# Patient Record
Sex: MALE | Race: BLACK OR AFRICAN AMERICAN | NOT HISPANIC OR LATINO | Employment: UNEMPLOYED | URBAN - METROPOLITAN AREA
[De-identification: names, ages, dates, MRNs, and addresses within clinical notes are randomized per-mention and may not be internally consistent; named-entity substitution may affect disease eponyms.]

---

## 2017-05-22 ENCOUNTER — TELEPHONE (OUTPATIENT)
Dept: SPINE | Facility: CLINIC | Age: 62
End: 2017-05-22

## 2017-05-22 DIAGNOSIS — M54.50 LOW BACK PAIN WITHOUT SCIATICA, UNSPECIFIED BACK PAIN LATERALITY, UNSPECIFIED CHRONICITY: Primary | ICD-10-CM

## 2017-05-23 ENCOUNTER — HOSPITAL ENCOUNTER (OUTPATIENT)
Dept: RADIOLOGY | Facility: OTHER | Age: 62
Discharge: HOME | End: 2017-05-23
Attending: ORTHOPAEDIC SURGERY
Payer: MEDICARE

## 2017-05-23 ENCOUNTER — OFFICE VISIT (OUTPATIENT)
Dept: SPINE | Facility: CLINIC | Age: 62
End: 2017-05-23
Payer: MEDICARE

## 2017-05-23 VITALS
HEIGHT: 74 IN | HEART RATE: 75 BPM | DIASTOLIC BLOOD PRESSURE: 87 MMHG | SYSTOLIC BLOOD PRESSURE: 147 MMHG | WEIGHT: 240 LBS | BODY MASS INDEX: 30.8 KG/M2

## 2017-05-23 DIAGNOSIS — M51.37 DDD (DEGENERATIVE DISC DISEASE), LUMBOSACRAL: ICD-10-CM

## 2017-05-23 DIAGNOSIS — G89.29 CHRONIC BILATERAL LOW BACK PAIN WITHOUT SCIATICA: ICD-10-CM

## 2017-05-23 DIAGNOSIS — M54.50 LOW BACK PAIN WITHOUT SCIATICA, UNSPECIFIED BACK PAIN LATERALITY, UNSPECIFIED CHRONICITY: ICD-10-CM

## 2017-05-23 DIAGNOSIS — M47.819 SPONDYLOSIS WITHOUT MYELOPATHY: ICD-10-CM

## 2017-05-23 DIAGNOSIS — M54.50 CHRONIC BILATERAL LOW BACK PAIN WITHOUT SCIATICA: ICD-10-CM

## 2017-05-23 PROCEDURE — 72100 X-RAY EXAM L-S SPINE 2/3 VWS: CPT | Mod: 26,,, | Performed by: RADIOLOGY

## 2017-05-23 PROCEDURE — 99999 PR PBB SHADOW E&M-EST. PATIENT-LVL V: CPT | Mod: PBBFAC,,, | Performed by: PHYSICIAN ASSISTANT

## 2017-05-23 PROCEDURE — 99204 OFFICE O/P NEW MOD 45 MIN: CPT | Mod: S$PBB,,, | Performed by: PHYSICIAN ASSISTANT

## 2017-05-23 PROCEDURE — 99215 OFFICE O/P EST HI 40 MIN: CPT | Mod: PBBFAC,25 | Performed by: PHYSICIAN ASSISTANT

## 2017-05-23 PROCEDURE — 72120 X-RAY BEND ONLY L-S SPINE: CPT | Mod: 26,,, | Performed by: RADIOLOGY

## 2017-05-23 RX ORDER — DICLOFENAC SODIUM 75 MG/1
75 TABLET, DELAYED RELEASE ORAL 2 TIMES DAILY PRN
Qty: 60 TABLET | Refills: 2 | Status: SHIPPED | OUTPATIENT
Start: 2017-05-23

## 2017-05-23 RX ORDER — TIZANIDINE 4 MG/1
4 TABLET ORAL EVERY 8 HOURS PRN
Qty: 90 TABLET | Refills: 2 | Status: SHIPPED | OUTPATIENT
Start: 2017-05-23 | End: 2018-09-14

## 2017-05-23 NOTE — PROGRESS NOTES
Subjective:     Patient ID:  Eb Grubbs is a 61 y.o. male.    Self-Referral    Chief Complaint:   Back pain    HPI    Eb Grubbs is a 61 y.o. male who presents with the above CC.  Patient has had low back pain since he was in the  in 1970s that would come and go.  About one month ago the pain has gotten worse and is getting progressively worse.  Pain is across the low back rated 7-8/10 that is constant and worse with standing and better with sitting and walking.  No radiating leg pain.  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      Patient had PT about 6 months ago that helped some.  One DEANDRE in 2003 with Dr. Simon that really helped.  No spine surgery.  Patient was taking tramadol and lodine but stopped because it stopped working.    Patient denies any recent accidents or trauma, no saddle anesthesias, and no bowel or bladder incontinence.      Review of Systems:  Please refer to page three of the spine center intake form for a complete review of systems.    Past Medical History:   Diagnosis Date    Arthritis     Cancer     Left leg above knee    Chronic gout     Diabetes mellitus     Hypertension      Past Surgical History:   Procedure Laterality Date    COLONOSCOPY N/A 2/23/2016    Procedure: COLONOSCOPY;  Surgeon: KELLEY Lora MD;  Location: [facility] ([address] [place] [address]);  Service: Endoscopy;  Laterality: N/A;    colonscopy      x2    KNEE ARTHROSCOPY W/ ACL RECONSTRUCTION  1975    left    KNEE SURGERY  7-2012    left/TKR    KNEE SURGERY  11-8-2013/done at [facility] in [place]    left/removed with new replacement along with partial femur replacement due to a giant cell tumor     Current Outpatient Prescriptions on File Prior to Visit   Medication Sig Dispense Refill    allopurinol (ZYLOPRIM) 100 MG tablet Take 100 mg by mouth once daily.       losartan (COZAAR) 50 MG tablet Take 50 mg by mouth once daily.       "metoprolol succinate (TOPROL-XL) 25 MG 24 hr tablet Take 25 mg by mouth once daily.      multivitamin capsule Take 1 capsule by mouth once daily.      simvastatin (ZOCOR) 5 MG tablet Take 2.5 mg by mouth every evening.      [DISCONTINUED] etodolac (LODINE) 400 MG tablet Take 1 tablet (400 mg total) by mouth 2 (two) times daily as needed (pain/inflammation). 30 tablet 0    calcium citrate-vitamin D3 250 mg calcium- 200 unit Tab Take 2 tablets by mouth 2 (two) times daily.      hydrocodone-acetaminophen 5-325mg (NORCO) 5-325 mg per tablet Take 1 tablet by mouth every 6 (six) hours as needed for Pain.      HYDROmorphone (DILAUDID) 4 MG tablet Take one or two every four hours as needed for pain 60 tablet 0    insulin aspart (NOVOLOG) 100 unit/mL injection Inject 8 Units into the skin 3 (three) times daily before meals.        No current facility-administered medications on file prior to visit.      Review of patient's allergies indicates:  No Known Allergies  Social History     Social History    Marital status:      Spouse name: N/A    Number of children: N/A    Years of education: N/A     Occupational History    Not on file.     Social History Main Topics    Smoking status: Never Smoker    Smokeless tobacco: Never Used    Alcohol use No    Drug use: No    Sexual activity: Yes     Partners: Female     Other Topics Concern    Not on file     Social History Narrative    No narrative on file     Family History   Problem Relation Age of Onset    Cancer Mother     Cancer Father        Objective:      Vitals:    05/23/17 0904   BP: (!) 147/87   Pulse: 75   Weight: 108.9 kg (240 lb)   Height: 6' 2" (1.88 m)   PainSc:   7   PainLoc: Back         Physical Exam:    General:  Eb Grubbs is well-developed, well-nourished, appears stated age, in no acute distress, alert and oriented to person, place, and time.    Pulmonary/Chest:  Respiratory effort normal  Abdominal: Exhibits no " distension  Psychiatric:  Normal mood and affect.  Behavior is normal.  Judgement and thought content normal    Musculoskeletal:    Patient arises from a sitting to standing position without difficulty.  Patient walks to the door without evidence of limp, pain, or abnormality of gait.    Lumbar ROM:   Pain in lumbar flexion, extension, right lateral bending.  No pain in left lateral bending.    Lumbar Spine Inspection:  Normal with no surgical scars and no visible rashes.    Lumbar Spine Palpation:  No tenderness to low back palpation.    SI Joint Palpation:  No tenderness to left SI Joint palpation.  Mild tenderness to right SI joint palpation.    Straight Leg Raise:  Negative right and left SLR.    Neurological: Alert and oriented to person, place, and time    Muscle strength against resistance:     Right Left   Hip flexion  5 / 5 5 / 5   Hip extension 5 / 5 5 / 5   Hip abduction 5 / 5 5 / 5   Hip adduction  5 / 5 5 / 5   Knee extension  5 / 5 5 / 5   Knee flexion 5 / 5 5 / 5   Dorsiflexion  5 / 5 5 / 5   EHL  5 / 5 5 / 5   Plantar flexion  5 / 5 5 / 5   Inversion of the feet 5 / 5 5 / 5   Eversion of the feet  5 / 5 5 / 5     Reflexes:     Right Left   Patellar 2+ 0   Achilles 2+ 2+     Clonus:  Negative bilaterally    On gross examination of the bilateral upper extremities, patient has full painfree ROM with no signs of clubbing, cyanosis, edema, or weakness.     XRAY Interpretation:     Lumbar spine ap/lateral/flexion/extension xrays were personally reviewed today.  No fractures.  No movement on flexion and extension.  L3-4 and L4-5 DDD worse at L3-4.      Assessment:          1. Spondylosis without myelopathy    2. DDD (degenerative disc disease), lumbosacral    3. Chronic bilateral low back pain without sciatica             Plan:          Orders Placed This Encounter    MRI Lumbar Spine W WO Cont    UREA NITROGEN (BUN)    CREATININE, SERUM    Ambulatory referral to Pain Clinic    Ambulatory consult to  [facility]    diclofenac (VOLTAREN) 75 MG EC tablet    tizanidine (ZANAFLEX) 4 MG tablet       Chronic low back pain worse in the last month.  Lumbar spondylosis    -[facility] program  -Diclofenac PRN with food  -Zanaflex PRN now  -BUN, Creatinine before MRI  -MRI lumbar spine with and without contrast  -Records release to Dr. Simon office for spine procedure  -Refer to Dr. Stanford for procedure recommendations  -If injections and PT fail and he would like a surgical opinion please send back and I will review with a spine surgeon    Follow-Up:  Return if symptoms worsen or fail to improve. If there are any questions prior to this, the patient was instructed to contact the office.       BETO Abbott, PA-C  Neurosurgery  [facility]  [facility]

## 2017-05-31 ENCOUNTER — TELEPHONE (OUTPATIENT)
Dept: REHABILITATION | Facility: OTHER | Age: 62
End: 2017-05-31

## 2017-06-01 ENCOUNTER — HOSPITAL ENCOUNTER (OUTPATIENT)
Dept: RADIOLOGY | Facility: OTHER | Age: 62
Discharge: HOME | End: 2017-06-01
Attending: NEUROLOGICAL SURGERY
Payer: MEDICARE

## 2017-06-01 DIAGNOSIS — M47.819 SPONDYLOSIS WITHOUT MYELOPATHY: ICD-10-CM

## 2017-06-01 DIAGNOSIS — G89.29 CHRONIC BILATERAL LOW BACK PAIN WITHOUT SCIATICA: ICD-10-CM

## 2017-06-01 DIAGNOSIS — M51.37 DDD (DEGENERATIVE DISC DISEASE), LUMBOSACRAL: ICD-10-CM

## 2017-06-01 DIAGNOSIS — M54.50 CHRONIC BILATERAL LOW BACK PAIN WITHOUT SCIATICA: ICD-10-CM

## 2017-06-01 PROCEDURE — 72148 MRI LUMBAR SPINE W/O DYE: CPT | Mod: 26,,, | Performed by: RADIOLOGY

## 2017-06-01 PROCEDURE — 72148 MRI LUMBAR SPINE W/O DYE: CPT | Mod: TC

## 2017-06-02 ENCOUNTER — TELEPHONE (OUTPATIENT)
Dept: SPINE | Facility: CLINIC | Age: 62
End: 2017-06-02

## 2017-06-02 ENCOUNTER — OFFICE VISIT (OUTPATIENT)
Dept: PAIN MEDICINE | Facility: CLINIC | Age: 62
End: 2017-06-02
Attending: NEUROLOGICAL SURGERY
Payer: MEDICARE

## 2017-06-02 VITALS
TEMPERATURE: 98 F | HEIGHT: 74 IN | WEIGHT: 240.31 LBS | RESPIRATION RATE: 18 BRPM | SYSTOLIC BLOOD PRESSURE: 128 MMHG | BODY MASS INDEX: 30.84 KG/M2 | HEART RATE: 92 BPM | DIASTOLIC BLOOD PRESSURE: 79 MMHG

## 2017-06-02 DIAGNOSIS — M47.816 SPONDYLOSIS OF LUMBAR REGION WITHOUT MYELOPATHY OR RADICULOPATHY: ICD-10-CM

## 2017-06-02 DIAGNOSIS — M51.36 DDD (DEGENERATIVE DISC DISEASE), LUMBAR: Primary | ICD-10-CM

## 2017-06-02 DIAGNOSIS — G89.4 CHRONIC PAIN DISORDER: ICD-10-CM

## 2017-06-02 PROCEDURE — 99213 OFFICE O/P EST LOW 20 MIN: CPT | Mod: PBBFAC | Performed by: ANESTHESIOLOGY

## 2017-06-02 PROCEDURE — 99204 OFFICE O/P NEW MOD 45 MIN: CPT | Mod: S$PBB,,, | Performed by: ANESTHESIOLOGY

## 2017-06-02 PROCEDURE — 99999 PR PBB SHADOW E&M-EST. PATIENT-LVL III: CPT | Mod: PBBFAC,,, | Performed by: ANESTHESIOLOGY

## 2017-06-02 RX ORDER — METFORMIN HYDROCHLORIDE EXTENDED-RELEASE TABLETS 500 MG/1
500 TABLET, FILM COATED, EXTENDED RELEASE ORAL 2 TIMES DAILY WITH MEALS
COMMUNITY

## 2017-06-02 NOTE — LETTER
June 2, 2017      Hema Nobles MD  [address]  [facility] [address]           [facility]  [address]  [facility] [address]  Phone: [phone]  Fax: [phone]          Patient: Eb Grubbs   MR Number: [id number]   YOB: 1955   Date of Visit: 6/2/2017       Dear Dr. Hema Nobles:    Thank you for referring Eb Grubbs to me for evaluation. Attached you will find relevant portions of my assessment and plan of care.    If you have questions, please do not hesitate to call me. I look forward to following Eb Grubbs along with you.    Sincerely,    Petra Stanford MD    Enclosure  CC:  No Recipients    If you would like to receive this communication electronically, please contact [email or web address] or [phone] to request more information on [employer] Link access.    For providers and/or their staff who would like to refer a patient to [facility], please contact us through our one-stop-shop provider referral line, [facility], at [phone].    If you feel you have received this communication in error or would no longer like to receive these types of communications, please e-mail [email or web address]

## 2017-06-02 NOTE — PROGRESS NOTES
Chronic Pain - New Consult    Referring Physician: Hema Nobles MD    Chief Complaint:   Chief Complaint   Patient presents with    Low-back Pain     Dr. Nobles        SUBJECTIVE: Disclaimer: This note has been generated using voice-recognition software. There may be typographical errors that have been missed during proof-reading    Initial encounter:    Eb Grubbs presents to the clinic for the evaluation of low back pain. The pain started over 40 years ago following service in the  and symptoms have been worsening in the last 4-5 years, however, worsened much more in the last month. He states that now it is more of a constant pain rather than intermittent like it had been in years prior. 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 Pt states he had an DEANDRE in 2003 with Dr. Simon that he states really helped.  No other history of spine surgery.      Brief history:    Pain Description:    The pain is located in the low back area and radiates to the right > left.  No radiation to legs.    At BEST  3/10     At WORST  7/10 on the WORST day.      On average pain is rated as 3/10.     Today the pain is rated as 4/10    The pain is described as aching and sharp      Symptoms interfere with daily activity and work.     Exacerbating factors: Standing.      Mitigating factors medications, rest, walking and sitting.     Patient denies night fever/night sweats, urinary incontinence, bowel incontinence, significant weight loss, significant motor weakness and loss of sensations.  Patient denies any suicidal or homicidal ideations    Pain Medications:   Current:  Voltaren, dilaudid and tizanidine    Tried in Past:  NSAIDs -Lodine  TCA -Never  SNRI -Never  Anti-convulsants -Never  Muscle Relaxants -Never  Opioids-hydrocodone, tramadol    Physical Therapy/Home Exercise: yes       report:  Reviewed and consistent with medication use as  prescribed.    Pain Procedures: lumbar DEANDRE    Chiropractor -never  Acupuncture - never  TENS unit -never  Spinal decompression -never  Joint replacement -left TKR    Imagin17 X-ray Lumbar Spine  CLINICAL INDICATION: 61 year old M with  low back pain    COMPARISON: No priors.    TECHNIQUE: AP, lateral, flexion, extension, and coned down lateral radiographs of the lumbar spine.    FINDINGS:     Vertebral body heights are maintained.  No evidence of fracture.      Normal sagittal alignment is preserved.No significant translation with flexion-extension.    Degenerative endplate changes at multiple levels with mild loss of disc height at L3-4. Moderate facet arthropathy in the mid to lower lumbar spine.    The visualized bowel gas pattern is within normal limits.   Impression         Degenerative changes as above.      Electronically signed by: AL DORADO MD  Date: 17  Time: 08:36        17 MRI Lumbar Spine  MRI lumbar spine without contrast.    Comparison: None.    Technique: Sagittal T1, T2, stir and axial T2 and T1-weighted images were obtained.  No IV contrast was utilized, an IV could not be inserted.    Results: The alignment of the lumbar spine is normal.  The vertebral body heights are well-maintained.  There is mild disc space narrowing noted at L2-L3 and L3-L4.  No evidence of malignant bone marrow replacement process or infection.  The conus medullaris terminates in good position.  Congenitally narrow canal from L3 through L5 also noted.    L1-L2: No disc abnormality.  No central canal or foraminal stenosis.  The facet joints appear normal.    L2-L3 there is diffuse disc bulge, ligamentum flavum hypertrophy.  This results in at least mild central canal stenosis.  There is crowding of the nerve roots above this level.  There is mild bilateral foramina narrowing.    L3-L4: Diffuse disc bulge and ligamentum flavum hypertrophy result in moderate central canal stenosis, there is mild right and  moderate left foramina narrowing.    L4-L5 demonstrates a diffuse disc bulge with a superimposed posterior central disc protrusion creating moderate to severe central canal stenosis.  There is mild bilateral foramina narrowing.    L5-S1 demonstrates a diffuse disc bulge with a left lateral annular tear.  There is no central canal stenosis.  There is no significant foramina narrowing.    The paraspinal soft tissues appear normal.   Impression      Multilevel central canal stenosis most severe at L4-L5 where there is a also a central disc protrusion which causes moderate to severe central canal stenosis at this level.  There is also moderate central canal stenosis at L3-L4.  Please see details of each level as above.         Past Medical History:   Diagnosis Date    Arthritis     Cancer     Left leg above knee    Chronic gout     Diabetes mellitus     Hypertension      Past Surgical History:   Procedure Laterality Date    COLONOSCOPY N/A 2/23/2016    Procedure: COLONOSCOPY;  Surgeon: KELLEY Lora MD;  Location: [facility] ([address] [place] [address]);  Service: Endoscopy;  Laterality: N/A;    colonscopy      x2    KNEE ARTHROSCOPY W/ ACL RECONSTRUCTION  1975    left    KNEE SURGERY  7-2012    left/TKR    KNEE SURGERY  11-8-2013/done at [facility] in [place]    left/removed with new replacement along with partial femur replacement due to a giant cell tumor     Social History     Social History    Marital status:      Spouse name: N/A    Number of children: N/A    Years of education: N/A     Occupational History    Not on file.     Social History Main Topics    Smoking status: Never Smoker    Smokeless tobacco: Never Used    Alcohol use No    Drug use: No    Sexual activity: Yes     Partners: Female     Other Topics Concern    Not on file     Social History Narrative    No narrative on file     Family History   Problem Relation Age of Onset    Cancer Mother     Cancer Father        Review of patient's  allergies indicates:  No Known Allergies    Current Outpatient Prescriptions   Medication Sig    allopurinol (ZYLOPRIM) 100 MG tablet Take 100 mg by mouth once daily.     calcium citrate-vitamin D3 250 mg calcium- 200 unit Tab Take 2 tablets by mouth 2 (two) times daily.    diclofenac (VOLTAREN) 75 MG EC tablet Take 1 tablet (75 mg total) by mouth 2 (two) times daily as needed.    HYDROmorphone (DILAUDID) 4 MG tablet Take one or two every four hours as needed for pain    insulin aspart (NOVOLOG) 100 unit/mL injection Inject 8 Units into the skin 3 (three) times daily before meals.     insulin NPH-insulin regular, 70/30, (NOVOLIN 70/30) 100 unit/mL (70-30) injection Inject into the skin 2 (two) times daily.    losartan (COZAAR) 50 MG tablet Take 50 mg by mouth once daily.    metformin (FORTAMET) 500 mg 24 hr tablet Take 500 mg by mouth 2 (two) times daily with meals.    metoprolol succinate (TOPROL-XL) 25 MG 24 hr tablet Take 25 mg by mouth once daily.    multivitamin capsule Take 1 capsule by mouth once daily.    simvastatin (ZOCOR) 5 MG tablet Take 2.5 mg by mouth every evening.    tizanidine (ZANAFLEX) 4 MG tablet Take 1 tablet (4 mg total) by mouth every 8 (eight) hours as needed.     No current facility-administered medications for this visit.        REVIEW OF SYSTEMS:    GENERAL:  No weight loss, malaise or fevers.  HEENT:   No recent changes in vision or hearing  NECK:  Negative for lumps, no difficulty with swallowing.  RESPIRATORY:  Negative for cough, wheezing or shortness of breath, patient denies any recent URI.  CARDIOVASCULAR:  Negative for chest pain, leg swelling or palpitations.  GI:  Negative for abdominal discomfort, blood in stools or black stools or change in bowel habits.  MUSCULOSKELETAL:  See HPI.  SKIN:  Negative for lesions, rash, and itching.  PSYCH:  No mood disorder or recent psychosocial stressors.  Patients sleep is disturbed secondary to pain.  HEMATOLOGY/LYMPHOLOGY:   "Negative for prolonged bleeding, bruising easily or swollen nodes.  Patient is not currently taking any anti-coagulants  ENDO: diabetes IDDM  NEURO:   No history of headaches, syncope, paralysis, seizures or tremors.  All other reviewed and negative other than HPI.    OBJECTIVE:    /79   Pulse 92   Temp 97.9 °F (36.6 °C) (Oral)   Resp 18   Ht 6' 2" (1.88 m)   Wt 109 kg (240 lb 4.8 oz)   BMI 30.85 kg/m²     PHYSICAL EXAMINATION:    GENERAL: Well appearing, in no acute distress, alert and oriented x3.  PSYCH:  Mood and affect appropriate.  SKIN: Skin color, texture, turgor normal, no rashes or lesions.  HEAD/FACE:  Normocephalic, atraumatic. Cranial nerves grossly intact.  NECK: No pain to palpation over the cervical paraspinous muscles. Spurling Negative. No pain with neck flexion, extension, or lateral flexion.   CV: RRR with palpation of the radial artery.  PULM: No evidence of respiratory difficulty, symmetric chest rise.  GI:  Soft and non-tender.  BACK: Straight leg raising in the supine position is negative to radicular pain. TTP over the facet joints of the lumbar spine or spinous processes.  Decreased range of motion with forward flexion and extension with pain reproduction. + facet loading.   EXTREMITIES: Peripheral joint ROM is full and pain free without obvious instability or laxity in all four extremities. No deformities, edema, or skin discoloration. Good capillary refill.  MUSCULOSKELETAL: Shoulder, hip, and knee provocative maneuvers are negative.  There is no pain with palpation over the sacroiliac joints bilaterally.  ANN negative.   Bilateral upper and lower extremity strength is normal and symmetric.  No atrophy or tone abnormalities are noted.  NEURO: Bilateral upper and lower extremity coordination and muscle stretch reflexes are physiologic and symmetric.    GAIT: antalgic gait    ASSESSMENT: 61 y.o. year old male with pain, consistent with     Encounter Diagnoses   Name Primary? "    DDD (degenerative disc disease), lumbar Yes    Chronic pain disorder     Spondylosis of lumbar region without myelopathy or radiculopathy        PLAN:     - MRI reviewed with patient  - Schedule for ILESI series of 2 injections, 2 weeks apart at L5/S1. Will monitor response after first injection to see if further DEANDRE is necessary,.  - Continue medications as above    The above plan and management options were discussed at length with patient. Patient is in agreement with the above and verbalized understanding. It will be communicated with the referring physician via electronic record, fax, or mail.    Nicholas Garcia  06/02/2017    I reviewed and edited the resident's note, I conducted my own interview and physical examination and agree with the findings.    Petra Stanford 06/02/2017

## 2017-06-02 NOTE — TELEPHONE ENCOUNTER
----- Message from Starla Melchor PA-C sent at 6/1/2017 10:50 PM CDT -----  I have been waiting on the records from Dr. Simon's office.    Do you know the status of this?    He has an appt with Abdoulaye tomorrow and I wanted it scanned in the chart before his appt.    Thanks,  BETO Abbott PA-C  Neurosurgery  [facility]  [facility]

## 2017-06-13 ENCOUNTER — HOSPITAL ENCOUNTER (OUTPATIENT)
Facility: OTHER | Age: 62
Discharge: HOME | End: 2017-06-13
Attending: ANESTHESIOLOGY | Admitting: ANESTHESIOLOGY
Payer: MEDICARE

## 2017-06-13 VITALS
BODY MASS INDEX: 31.06 KG/M2 | TEMPERATURE: 98 F | WEIGHT: 242 LBS | HEART RATE: 71 BPM | RESPIRATION RATE: 18 BRPM | HEIGHT: 74 IN | SYSTOLIC BLOOD PRESSURE: 138 MMHG | DIASTOLIC BLOOD PRESSURE: 84 MMHG | OXYGEN SATURATION: 98 %

## 2017-06-13 DIAGNOSIS — M54.16 LUMBAR RADICULOPATHY: Primary | ICD-10-CM

## 2017-06-13 LAB — POCT GLUCOSE: 147 MG/DL (ref 70–110)

## 2017-06-13 PROCEDURE — 99152 MOD SED SAME PHYS/QHP 5/>YRS: CPT | Mod: ,,, | Performed by: ANESTHESIOLOGY

## 2017-06-13 PROCEDURE — 63600175 PHARM REV CODE 636 W HCPCS: Performed by: ANESTHESIOLOGY

## 2017-06-13 PROCEDURE — 25000003 PHARM REV CODE 250: Performed by: ANESTHESIOLOGY

## 2017-06-13 PROCEDURE — 25500020 PHARM REV CODE 255: Performed by: ANESTHESIOLOGY

## 2017-06-13 PROCEDURE — 62323 NJX INTERLAMINAR LMBR/SAC: CPT | Performed by: ANESTHESIOLOGY

## 2017-06-13 PROCEDURE — 62323 NJX INTERLAMINAR LMBR/SAC: CPT | Mod: ,,, | Performed by: ANESTHESIOLOGY

## 2017-06-13 PROCEDURE — 82947 ASSAY GLUCOSE BLOOD QUANT: CPT | Performed by: ANESTHESIOLOGY

## 2017-06-13 PROCEDURE — 62322 NJX INTERLAMINAR LMBR/SAC: CPT | Performed by: ANESTHESIOLOGY

## 2017-06-13 RX ORDER — METHYLPREDNISOLONE ACETATE 40 MG/ML
INJECTION, SUSPENSION INTRA-ARTICULAR; INTRALESIONAL; INTRAMUSCULAR; SOFT TISSUE
Status: DISCONTINUED | OUTPATIENT
Start: 2017-06-13 | End: 2017-06-13 | Stop reason: HOSPADM

## 2017-06-13 RX ORDER — MIDAZOLAM HYDROCHLORIDE 1 MG/ML
INJECTION INTRAMUSCULAR; INTRAVENOUS
Status: DISCONTINUED | OUTPATIENT
Start: 2017-06-13 | End: 2017-06-13 | Stop reason: HOSPADM

## 2017-06-13 RX ORDER — LIDOCAINE HYDROCHLORIDE 10 MG/ML
INJECTION INFILTRATION; PERINEURAL
Status: DISCONTINUED | OUTPATIENT
Start: 2017-06-13 | End: 2017-06-13 | Stop reason: HOSPADM

## 2017-06-13 RX ORDER — SODIUM CHLORIDE 9 MG/ML
INJECTION, SOLUTION INTRAVENOUS CONTINUOUS
Status: DISCONTINUED | OUTPATIENT
Start: 2017-06-13 | End: 2017-06-13 | Stop reason: HOSPADM

## 2017-06-13 RX ORDER — BUPIVACAINE HYDROCHLORIDE 2.5 MG/ML
INJECTION, SOLUTION EPIDURAL; INFILTRATION; INTRACAUDAL
Status: DISCONTINUED | OUTPATIENT
Start: 2017-06-13 | End: 2017-06-13 | Stop reason: HOSPADM

## 2017-06-13 RX ADMIN — SODIUM CHLORIDE: 0.9 INJECTION, SOLUTION INTRAVENOUS at 09:06

## 2017-06-13 NOTE — OP NOTE
Lumbar Interlaminar Epidural Steroid Injection under Fluoroscopic Guidance.   Time-out taken to identify patient and procedure prior to starting the procedure.     06/13/2017    PROCEDURE: Interlaminar epidural steroid injection under fluoroscopic guidance.     Pre-Op diagnosis: lumbar stenosis with radiculopathy    Post-Op diagnosis: same    PHYSICIAN: TWIN DE LA CRUZ     Assistants:   Dominique Greer MD PGY-3  I was present and supervising all critical portions of the procedure     ESTIMATED BLOOD LOSS: none.     COMPLICATIONS: none.     SPECIMENS: none    TECHNIQUE: With the patient laying in a prone position, the area was prepped and draped in the usual sterile fashion using ChloraPrep and a fenestrated drape. 1% lidocaine was given using a 27-gauge needle by raising a wheal and going down to the hub of the needle over the L5/S1 interlaminar space.  The interlaminar space was then approached with a 3.5 inch 18-gauge Touhy needle was introduced under fluoroscopic guidance in the AP and Lateral view. Once the Ligamentum flavum was encountered loss of resistance to saline was used to enter the epidural space. With positive loss of resistance and negative CSF or Blood, 3mL contrast dye Omnipaque (300mg/ml) was injected to confirm placement and there was no vascular runoff. Then 1ml 80mg/ml Depomedrol + 1mL 0.25% Bupivicaine + 8mL preservative free normal saline was injected slowly. Displacement of the radio opaque contrast after injection of the medication confirmed that the medication went into the area of the epidural space.  The patient tolerated the procedure well.     Conscious sedation provided by M.D    The patient was monitored with continuous pulse oximetry, EKG, and intermittent blood pressure monitors.  The patient was hemodynamically stable throughout the entire process was responsive to voice, and breathing spontaneously.  Supplemental O2 was provided at 2L/min via nasal cannula.  Patient was  comfortable for the duration of the procedure.    There was a total of 2mg IV Midazolam was titrated for the procedure      The patient was monitored after the procedure.   They were given post-procedure and discharge instructions to follow at home.  The patient was discharged in a stable condition.

## 2017-06-13 NOTE — DISCHARGE INSTRUCTIONS
Home Care Instructions Pain Management:    1. DIET:   You may resume your normal diet today.   2. BATHING:   You may shower with luke warm water. No tub baths or anything that will soak injection sites under water for 24 hours.  3. DRESSING:   You may remove your bandage today.   4. ACTIVITY LEVEL:   You may resume your normal activities 24 hrs after your procedure. Nothing strenuous today.  5. MEDICATIONS:   You may resume your normal medications today. To restart blood thinners, ask your doctor.  6. SPECIAL INSTRUCTIONS:   No heat to the injection site for 24 hrs including, bath or shower, heating pad, moist heat, or hot tubs.    Use ice pack to injection site for any pain or discomfort.  Apply ice packs for 20 minute intervals as needed.     If you have received any sedatives by mouth today you may not drive for 12 hours.    If you have received any sedation through your IV, you may not drive for 24 hrs.     PLEASE CALL YOUR DOCTOR IF:  1. Redness or swelling around the injection site.  2. Fever of 101 degrees or more  3. Drainage (pus) from the injection site.  4. For any continuous bleeding (some dried blood over the incision is normal.)    FOR EMERGENCIES:   If any unusual problems or difficulties occur during clinic hours, call [phone] or [phone].     Procedural Sedation  Procedural sedation is medicine to ease discomfort, pain, and anxiety during a procedure. The medicine is often given through an intravenous (IV) line in your arm or hand. In some cases, the medicine may be taken by mouth or inhaled. While you are under sedation, you will likely be awake. But you may not remember anything afterward.  Why procedural sedation is used  Sedation is used for many types of procedures. The goal is to reduce pain, anxiety, and stressful memories of a procedure. It can also help your health care provider treat you. For example, having a broken bone fixed may be easier if you feel relaxed.  Procedural sedation is  used only for short, basic procedures. It is not used for complex surgeries. Some procedures that use this type of sedation include:  · Dental surgery  · Breast biopsy, to take a sample of breast tissue  · Endoscopy, to look at gastrointestinal problems  · Bronchoscopy, to check for lung problems  · Bone or joint realignment, to fix a broken bone or dislocated joint  · Minor foot or skin surgery  · Electrical cardioversion, to restore a normal heart rhythm  · Lumbar puncture, to assess neurological disease  Risks of procedural sedation  Procedural sedation has some risks and possible side effects, such as:  · Headache  · Nausea and vomiting  · Unpleasant memory of the procedure  · Lowered rate of breathing  · Changes in heart rate and blood pressure (rare)  · Inhalation of stomach contents into your lungs (rare)  Side effects will likely go away shortly after the procedure. Your health care team will watch your heart rate and breathing during your sedation. This is to help prevent problems.  Your own risks may vary based on your age and your overall health. They also depend on the type of sedation you are given. Talk with your health care provider about the risks that apply most to you.  Getting ready for procedural sedation  Talk with your health care provider how to get ready for your procedure. Tell him or her about all the medicines you take. This includes over-the-counter medicines such as ibuprofen. It also includes vitamins, herbs, and other supplements. You may need to stop taking some medicines before the procedure, such as blood thinners and aspirin. If you smoke, you may need to stop. Talk with your health care provider if you need help to stop smoking.  Tell your health care provider if you:  · Have had any problems in the past with sedation or anesthesia  · Have had any recent changes in your health, such as an infection or fever  · Are pregnant or think you may be pregnant  Also, make sure to:  · Ask a  family member or friend to take you home after the procedure. You cannot drive on the day you receive sedation.  · Not eat or drink after midnight the night before your procedure, if advised.  · Follow all other instructions from your health care provider.  During your procedural sedation  You may have your procedure in a hospital or a medical clinic. Sedation is done by a trained health care provider. In general, you can expect the following:  · You will be given medicine through an IV line in your arm or hand. Or you may receive a shot. The medicine may also be given by mouth. Or you may inhale it through a mask.  · If you receive medicine through an IV, you may feel the effects very quickly. You will start to feel relaxed and drowsy.  · During the procedure, your heart rate, breathing, and blood pressure will be closely watched. Your breathing and blood pressure may decrease a little. But you will likely not need help with your breathing. You may receive a little extra oxygen through a mask.  · You will probably be awake the entire time. If you do fall asleep, you should be easy to wake up, if needed. You should feel little or no pain.  · When your procedure is over, the sedative medicine will be stopped.  After your procedural sedation  You will begin to feel more awake and aware. But you will likely be drowsy for a while afterward. You will be closely watched as you become more alert. You may have a faint memory of the procedure. Or you may not remember it at all.  You should be able to return home within an hour or two after your procedure. Plan to have someone stay with you for a few hours. Side effects such as headache and nausea may go away quickly. Tell your health care provider if they continue.  Dont drive or make any important decisions for at least 24 hours. Be sure to follow all after-care instructions.      When to call your health care provider  Have someone call your health care provider right  away if you have any of these:  · Drowsiness that gets worse  · Weakness or dizziness that gets worse  · Repeated vomiting  · You cant be awakened   Date Last Reviewed: 2/6/2015  © 9334-6623 [employer]. [address] [place] [address], [place] [address]. All rights reserved. This information is not intended as a substitute for professional medical care. Always follow your healthcare professional's instructions.

## 2017-06-13 NOTE — DISCHARGE SUMMARY
Discharge Note  Short Stay      SUMMARY     Admit Date: 6/13/2017    Attending Physician: Petra Stanford      Discharge Physician: Petra Stanford      Discharge Date: 6/13/2017 10:18 AM     PROCEDURE: Interlaminar epidural steroid injection under fluoroscopic guidance.     Pre-Op diagnosis: lumbar stenosis with radiculopathy      Disposition: Home or self care    Patient Instructions:   Current Discharge Medication List      CONTINUE these medications which have NOT CHANGED    Details   allopurinol (ZYLOPRIM) 100 MG tablet Take 100 mg by mouth once daily.       insulin NPH-insulin regular, 70/30, (NOVOLIN 70/30) 100 unit/mL (70-30) injection Inject into the skin 2 (two) times daily.      losartan (COZAAR) 50 MG tablet Take 50 mg by mouth once daily.      metformin (FORTAMET) 500 mg 24 hr tablet Take 500 mg by mouth 2 (two) times daily with meals.      metoprolol succinate (TOPROL-XL) 25 MG 24 hr tablet Take 25 mg by mouth once daily.      multivitamin capsule Take 1 capsule by mouth once daily.      simvastatin (ZOCOR) 5 MG tablet Take 2.5 mg by mouth every evening.      calcium citrate-vitamin D3 250 mg calcium- 200 unit Tab Take 2 tablets by mouth 2 (two) times daily.      diclofenac (VOLTAREN) 75 MG EC tablet Take 1 tablet (75 mg total) by mouth 2 (two) times daily as needed.  Qty: 60 tablet, Refills: 2    Associated Diagnoses: Spondylosis without myelopathy; DDD (degenerative disc disease), lumbosacral; Chronic bilateral low back pain without sciatica      HYDROmorphone (DILAUDID) 4 MG tablet Take one or two every four hours as needed for pain  Qty: 60 tablet, Refills: 0    Associated Diagnoses: Pain      insulin aspart (NOVOLOG) 100 unit/mL injection Inject 8 Units into the skin 3 (three) times daily before meals.       tizanidine (ZANAFLEX) 4 MG tablet Take 1 tablet (4 mg total) by mouth every 8 (eight) hours as needed.  Qty: 90 tablet, Refills: 2    Associated Diagnoses: Spondylosis without myelopathy;  DDD (degenerative disc disease), lumbosacral; Chronic bilateral low back pain without sciatica             Resume home diet and activity

## 2017-06-13 NOTE — H&P (VIEW-ONLY)
Chronic Pain - New Consult    Referring Physician: Hema Nobles MD    Chief Complaint:   Chief Complaint   Patient presents with    Low-back Pain     Dr. Nobles        SUBJECTIVE: Disclaimer: This note has been generated using voice-recognition software. There may be typographical errors that have been missed during proof-reading    Initial encounter:    Eb Grubbs presents to the clinic for the evaluation of low back pain. The pain started over 40 years ago following service in the  and symptoms have been worsening in the last 4-5 years, however, worsened much more in the last month. He states that now it is more of a constant pain rather than intermittent like it had been in years prior. 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 Pt states he had an DEANDRE in 2003 with Dr. Simon that he states really helped.  No other history of spine surgery.      Brief history:    Pain Description:    The pain is located in the low back area and radiates to the right > left.  No radiation to legs.    At BEST  3/10     At WORST  7/10 on the WORST day.      On average pain is rated as 3/10.     Today the pain is rated as 4/10    The pain is described as aching and sharp      Symptoms interfere with daily activity and work.     Exacerbating factors: Standing.      Mitigating factors medications, rest, walking and sitting.     Patient denies night fever/night sweats, urinary incontinence, bowel incontinence, significant weight loss, significant motor weakness and loss of sensations.  Patient denies any suicidal or homicidal ideations    Pain Medications:   Current:  Voltaren, dilaudid and tizanidine    Tried in Past:  NSAIDs -Lodine  TCA -Never  SNRI -Never  Anti-convulsants -Never  Muscle Relaxants -Never  Opioids-hydrocodone, tramadol    Physical Therapy/Home Exercise: yes       report:  Reviewed and consistent with medication use as  prescribed.    Pain Procedures: lumbar DEANDRE    Chiropractor -never  Acupuncture - never  TENS unit -never  Spinal decompression -never  Joint replacement -left TKR    Imagin17 X-ray Lumbar Spine  CLINICAL INDICATION: 61 year old M with  low back pain    COMPARISON: No priors.    TECHNIQUE: AP, lateral, flexion, extension, and coned down lateral radiographs of the lumbar spine.    FINDINGS:     Vertebral body heights are maintained.  No evidence of fracture.      Normal sagittal alignment is preserved.No significant translation with flexion-extension.    Degenerative endplate changes at multiple levels with mild loss of disc height at L3-4. Moderate facet arthropathy in the mid to lower lumbar spine.    The visualized bowel gas pattern is within normal limits.   Impression         Degenerative changes as above.      Electronically signed by: AL DORADO MD  Date: 17  Time: 08:36        17 MRI Lumbar Spine  MRI lumbar spine without contrast.    Comparison: None.    Technique: Sagittal T1, T2, stir and axial T2 and T1-weighted images were obtained.  No IV contrast was utilized, an IV could not be inserted.    Results: The alignment of the lumbar spine is normal.  The vertebral body heights are well-maintained.  There is mild disc space narrowing noted at L2-L3 and L3-L4.  No evidence of malignant bone marrow replacement process or infection.  The conus medullaris terminates in good position.  Congenitally narrow canal from L3 through L5 also noted.    L1-L2: No disc abnormality.  No central canal or foraminal stenosis.  The facet joints appear normal.    L2-L3 there is diffuse disc bulge, ligamentum flavum hypertrophy.  This results in at least mild central canal stenosis.  There is crowding of the nerve roots above this level.  There is mild bilateral foramina narrowing.    L3-L4: Diffuse disc bulge and ligamentum flavum hypertrophy result in moderate central canal stenosis, there is mild right and  moderate left foramina narrowing.    L4-L5 demonstrates a diffuse disc bulge with a superimposed posterior central disc protrusion creating moderate to severe central canal stenosis.  There is mild bilateral foramina narrowing.    L5-S1 demonstrates a diffuse disc bulge with a left lateral annular tear.  There is no central canal stenosis.  There is no significant foramina narrowing.    The paraspinal soft tissues appear normal.   Impression      Multilevel central canal stenosis most severe at L4-L5 where there is a also a central disc protrusion which causes moderate to severe central canal stenosis at this level.  There is also moderate central canal stenosis at L3-L4.  Please see details of each level as above.         Past Medical History:   Diagnosis Date    Arthritis     Cancer     Left leg above knee    Chronic gout     Diabetes mellitus     Hypertension      Past Surgical History:   Procedure Laterality Date    COLONOSCOPY N/A 2/23/2016    Procedure: COLONOSCOPY;  Surgeon: KELLEY Lora MD;  Location: [facility] ([address] [place] [address]);  Service: Endoscopy;  Laterality: N/A;    colonscopy      x2    KNEE ARTHROSCOPY W/ ACL RECONSTRUCTION  1975    left    KNEE SURGERY  7-2012    left/TKR    KNEE SURGERY  11-8-2013/done at [facility] in [place]    left/removed with new replacement along with partial femur replacement due to a giant cell tumor     Social History     Social History    Marital status:      Spouse name: N/A    Number of children: N/A    Years of education: N/A     Occupational History    Not on file.     Social History Main Topics    Smoking status: Never Smoker    Smokeless tobacco: Never Used    Alcohol use No    Drug use: No    Sexual activity: Yes     Partners: Female     Other Topics Concern    Not on file     Social History Narrative    No narrative on file     Family History   Problem Relation Age of Onset    Cancer Mother     Cancer Father        Review of patient's  allergies indicates:  No Known Allergies    Current Outpatient Prescriptions   Medication Sig    allopurinol (ZYLOPRIM) 100 MG tablet Take 100 mg by mouth once daily.     calcium citrate-vitamin D3 250 mg calcium- 200 unit Tab Take 2 tablets by mouth 2 (two) times daily.    diclofenac (VOLTAREN) 75 MG EC tablet Take 1 tablet (75 mg total) by mouth 2 (two) times daily as needed.    HYDROmorphone (DILAUDID) 4 MG tablet Take one or two every four hours as needed for pain    insulin aspart (NOVOLOG) 100 unit/mL injection Inject 8 Units into the skin 3 (three) times daily before meals.     insulin NPH-insulin regular, 70/30, (NOVOLIN 70/30) 100 unit/mL (70-30) injection Inject into the skin 2 (two) times daily.    losartan (COZAAR) 50 MG tablet Take 50 mg by mouth once daily.    metformin (FORTAMET) 500 mg 24 hr tablet Take 500 mg by mouth 2 (two) times daily with meals.    metoprolol succinate (TOPROL-XL) 25 MG 24 hr tablet Take 25 mg by mouth once daily.    multivitamin capsule Take 1 capsule by mouth once daily.    simvastatin (ZOCOR) 5 MG tablet Take 2.5 mg by mouth every evening.    tizanidine (ZANAFLEX) 4 MG tablet Take 1 tablet (4 mg total) by mouth every 8 (eight) hours as needed.     No current facility-administered medications for this visit.        REVIEW OF SYSTEMS:    GENERAL:  No weight loss, malaise or fevers.  HEENT:   No recent changes in vision or hearing  NECK:  Negative for lumps, no difficulty with swallowing.  RESPIRATORY:  Negative for cough, wheezing or shortness of breath, patient denies any recent URI.  CARDIOVASCULAR:  Negative for chest pain, leg swelling or palpitations.  GI:  Negative for abdominal discomfort, blood in stools or black stools or change in bowel habits.  MUSCULOSKELETAL:  See HPI.  SKIN:  Negative for lesions, rash, and itching.  PSYCH:  No mood disorder or recent psychosocial stressors.  Patients sleep is disturbed secondary to pain.  HEMATOLOGY/LYMPHOLOGY:   "Negative for prolonged bleeding, bruising easily or swollen nodes.  Patient is not currently taking any anti-coagulants  ENDO: diabetes IDDM  NEURO:   No history of headaches, syncope, paralysis, seizures or tremors.  All other reviewed and negative other than HPI.    OBJECTIVE:    /79   Pulse 92   Temp 97.9 °F (36.6 °C) (Oral)   Resp 18   Ht 6' 2" (1.88 m)   Wt 109 kg (240 lb 4.8 oz)   BMI 30.85 kg/m²     PHYSICAL EXAMINATION:    GENERAL: Well appearing, in no acute distress, alert and oriented x3.  PSYCH:  Mood and affect appropriate.  SKIN: Skin color, texture, turgor normal, no rashes or lesions.  HEAD/FACE:  Normocephalic, atraumatic. Cranial nerves grossly intact.  NECK: No pain to palpation over the cervical paraspinous muscles. Spurling Negative. No pain with neck flexion, extension, or lateral flexion.   CV: RRR with palpation of the radial artery.  PULM: No evidence of respiratory difficulty, symmetric chest rise.  GI:  Soft and non-tender.  BACK: Straight leg raising in the supine position is negative to radicular pain. TTP over the facet joints of the lumbar spine or spinous processes.  Decreased range of motion with forward flexion and extension with pain reproduction. + facet loading.   EXTREMITIES: Peripheral joint ROM is full and pain free without obvious instability or laxity in all four extremities. No deformities, edema, or skin discoloration. Good capillary refill.  MUSCULOSKELETAL: Shoulder, hip, and knee provocative maneuvers are negative.  There is no pain with palpation over the sacroiliac joints bilaterally.  ANN negative.   Bilateral upper and lower extremity strength is normal and symmetric.  No atrophy or tone abnormalities are noted.  NEURO: Bilateral upper and lower extremity coordination and muscle stretch reflexes are physiologic and symmetric.    GAIT: antalgic gait    ASSESSMENT: 61 y.o. year old male with pain, consistent with     Encounter Diagnoses   Name Primary? "    DDD (degenerative disc disease), lumbar Yes    Chronic pain disorder     Spondylosis of lumbar region without myelopathy or radiculopathy        PLAN:     - MRI reviewed with patient  - Schedule for ILESI series of 2 injections, 2 weeks apart at L5/S1. Will monitor response after first injection to see if further DEANDRE is necessary,.  - Continue medications as above    The above plan and management options were discussed at length with patient. Patient is in agreement with the above and verbalized understanding. It will be communicated with the referring physician via electronic record, fax, or mail.    Nicholas Garcia  06/02/2017    I reviewed and edited the resident's note, I conducted my own interview and physical examination and agree with the findings.    Petra Stanford 06/02/2017

## 2018-09-14 ENCOUNTER — OFFICE VISIT (OUTPATIENT)
Dept: URGENT CARE | Facility: CLINIC | Age: 63
End: 2018-09-14
Payer: MEDICARE

## 2018-09-14 VITALS
HEART RATE: 72 BPM | TEMPERATURE: 98 F | RESPIRATION RATE: 20 BRPM | WEIGHT: 253 LBS | BODY MASS INDEX: 32.47 KG/M2 | SYSTOLIC BLOOD PRESSURE: 167 MMHG | HEIGHT: 74 IN | OXYGEN SATURATION: 97 % | DIASTOLIC BLOOD PRESSURE: 85 MMHG

## 2018-09-14 DIAGNOSIS — M54.50 ACUTE LEFT-SIDED LOW BACK PAIN WITHOUT SCIATICA: Primary | ICD-10-CM

## 2018-09-14 PROCEDURE — 99214 OFFICE O/P EST MOD 30 MIN: CPT | Mod: 25,S$GLB,, | Performed by: NURSE PRACTITIONER

## 2018-09-14 PROCEDURE — 96372 THER/PROPH/DIAG INJ SC/IM: CPT | Mod: S$GLB,,, | Performed by: NURSE PRACTITIONER

## 2018-09-14 RX ORDER — KETOROLAC TROMETHAMINE 30 MG/ML
30 INJECTION, SOLUTION INTRAMUSCULAR; INTRAVENOUS
Status: COMPLETED | OUTPATIENT
Start: 2018-09-14 | End: 2018-09-14

## 2018-09-14 RX ORDER — TIZANIDINE 4 MG/1
4 TABLET ORAL 3 TIMES DAILY
Qty: 21 TABLET | Refills: 0 | Status: SHIPPED | OUTPATIENT
Start: 2018-09-14 | End: 2018-09-21

## 2018-09-14 RX ORDER — ETODOLAC 400 MG/1
400 TABLET, FILM COATED ORAL 2 TIMES DAILY
Qty: 20 TABLET | Refills: 0 | Status: SHIPPED | OUTPATIENT
Start: 2018-09-14 | End: 2018-09-24

## 2018-09-14 RX ORDER — ETODOLAC 400 MG/1
400 TABLET, FILM COATED ORAL 2 TIMES DAILY
Qty: 20 TABLET | Refills: 0 | Status: SHIPPED | OUTPATIENT
Start: 2018-09-14 | End: 2018-09-14

## 2018-09-14 RX ORDER — TIZANIDINE 4 MG/1
4 TABLET ORAL 3 TIMES DAILY
Qty: 21 TABLET | Refills: 0 | Status: SHIPPED | OUTPATIENT
Start: 2018-09-14 | End: 2018-09-14

## 2018-09-14 RX ADMIN — KETOROLAC TROMETHAMINE 30 MG: 30 INJECTION, SOLUTION INTRAMUSCULAR; INTRAVENOUS at 11:09

## 2018-09-14 NOTE — PROGRESS NOTES
"Subjective:       Patient ID: Eb Grubbs is a 63 y.o. male.    Vitals:  height is 6' 2" (1.88 m) and weight is 114.8 kg (253 lb). His temperature is 97.9 °F (36.6 °C). His blood pressure is 167/85 (abnormal) and his pulse is 72. His respiration is 20 and oxygen saturation is 97%.     Chief Complaint: Back Pain    The patient presents to the clinic today of lower back pain last week.  The patient injured himself while mopping a week ago.  He has extensive history of lumbar issues he has previously seen Pain ortho for steroid injections.  He denies any numbness and tingling to his lower extremities.  He is ambulatory in the clinic today with moderate amount pain.  The pain is made worse with ambulation and straining of his left leg.  He denies any bowel or bladder incontinence.  He is using a cane today in the clinic.  He has not seen ortho in over a year.      Back Pain   This is a new problem. The current episode started 1 to 4 weeks ago. The problem occurs constantly. The problem is unchanged. The pain is present in the gluteal and lumbar spine. The quality of the pain is described as shooting. Radiates to: across back. The pain is at a severity of 10/10. The pain is severe. The pain is the same all the time. The symptoms are aggravated by bending, position, lying down, sitting, standing and twisting. Stiffness is present all day. Pertinent negatives include no abdominal pain, bladder incontinence, bowel incontinence, dysuria or numbness. He has tried NSAIDs for the symptoms. The treatment provided no relief.     Review of Systems   Constitution: Negative for malaise/fatigue.   Skin: Negative for rash.   Musculoskeletal: Positive for back pain, muscle weakness, myalgias and stiffness. Negative for muscle cramps.   Gastrointestinal: Negative for abdominal pain and bowel incontinence.   Genitourinary: Negative for bladder incontinence, dysuria, hematuria and urgency.   Neurological: Negative for disturbances in " coordination and numbness.       Objective:      Physical Exam   Constitutional: He is oriented to person, place, and time. Vital signs are normal. He appears well-developed and well-nourished. He is active and cooperative. No distress.   HENT:   Head: Normocephalic and atraumatic.   Nose: Nose normal.   Mouth/Throat: Oropharynx is clear and moist and mucous membranes are normal.   Eyes: Conjunctivae and lids are normal.   Neck: Trachea normal, normal range of motion, full passive range of motion without pain and phonation normal. Neck supple.   Cardiovascular: Normal rate, regular rhythm, normal heart sounds, intact distal pulses and normal pulses.   Pulses:       Femoral pulses are 2+ on the right side, and 2+ on the left side.       Popliteal pulses are 2+ on the right side, and 2+ on the left side.        Dorsalis pedis pulses are 2+ on the right side, and 2+ on the left side.        Posterior tibial pulses are 2+ on the right side, and 2+ on the left side.   Pulmonary/Chest: Effort normal and breath sounds normal.   Abdominal: Soft. Normal appearance and bowel sounds are normal. He exhibits no abdominal bruit, no pulsatile midline mass and no mass.   Musculoskeletal: He exhibits no edema or deformity.        Lumbar back: He exhibits tenderness, bony tenderness, pain and spasm.        Back:    Neurological: He is alert and oriented to person, place, and time. He has normal strength and normal reflexes. No cranial nerve deficit or sensory deficit.   Reflex Scores:       Patellar reflexes are 2+ on the right side and 2+ on the left side.       Achilles reflexes are 2+ on the right side and 2+ on the left side.  Unable to lay flat to assess straight leg raise   Skin: Skin is warm, dry and intact. He is not diaphoretic.   Psychiatric: He has a normal mood and affect. His speech is normal and behavior is normal. Judgment and thought content normal. Cognition and memory are normal.   Nursing note and vitals  reviewed.      Reading Physician Reading Date Result Priority   Gerardo Haile MD 9/14/2018       Narrative     EXAMINATION:  XR LUMBAR SPINE 2 OR 3 VIEWS    CLINICAL HISTORY:  Low back pain, <6wks, no red flags, no prior management;Lower back pain injury;  Low back pain    TECHNIQUE:  AP and lateral views lumbar spine    COMPARISON:  Lumbar spine series 05/23/2017 and MRI lumbar spine 06/01/2017    FINDINGS:  Five non-rib-bearing lumbar type vertebral bodies.  There is dextrocurvature similar to prior.  Lumbar lordosis is maintained.  Vertebral body heights are maintained.  No displaced fracture, dislocation or significant listhesis.  Multilevel degenerative disc disease with endplate changes and facet arthrosis most prominent at the mid to lower lumbosacral spine, not significantly progressed from prior.  No subcutaneous emphysema or radiodense retained foreign body.      Impression       No acute displaced fracture-dislocation identified.      Electronically signed by: Gerardo Haile MD  Date: 09/14/2018  Time: 12:16            Assessment:       1. Acute left-sided low back pain without sciatica        Plan:         Acute left-sided low back pain without sciatica  -     X-Ray Lumbar Spine 2 Or 3 Views; Future; Expected date: 09/14/2018  -     Ambulatory referral to Orthopedics  -     ketorolac injection 30 mg; Inject 1 mL (30 mg total) into the muscle one time.  -     tiZANidine (ZANAFLEX) 4 MG tablet; Take 1 tablet (4 mg total) by mouth 3 (three) times daily. for 7 days  Dispense: 21 tablet; Refill: 0  -     etodolac (LODINE) 400 MG tablet; Take 1 tablet (400 mg total) by mouth 2 (two) times daily. for 10 days  Dispense: 20 tablet; Refill: 0      Patient Instructions       Back Pain (Acute or Chronic)    Back pain is one of the most common problems. The good news is that most people feel better in 1 to 2 weeks, and most of the rest in 1 to 2 months. Most people can remain active.  People experience and describe  pain differently; not everyone is the same.  · The pain can be sharp, stabbing, shooting, aching, cramping or burning.  · Movement, standing, bending, lifting, sitting, or walking may worsen pain.  · It can be localized to one spot or area, or it can be more generalized.  · It can spread or radiate upwards, to the front, or go down your arms or legs (sciatica).  · It can cause muscle spasm.  Most of the time, mechanical problems with the muscles or spine cause the pain. Mechanical problems are usually caused by an injury to the muscles or ligaments. While illness can cause back pain, it is usually not caused by a serious illness. Mechanical problems include:   · Physical activity such as sports, exercise, work, or normal activity  · Overexertion, lifting, pushing, pulling incorrectly or too aggressively  · Sudden twisting, bending, or stretching from an accident, or accidental movement  · Poor posture  · Stretching or moving wrong, without noticing pain at the time  · Poor coordination, lack of regular exercise (check with your doctor about this)  · Spinal disc disease or arthritis  · Stress  Pain can also be related to pregnancy, or illness like appendicitis, bladder or kidney infections, pelvic infections, and many other things.  Acute back pain usually gets better in 1 to 2 weeks. Back pain related to disk disease, arthritis in the spinal joints or spinal stenosis (narrowing of the spinal canal) can become chronic and last for months or years.  Unless you had a physical injury (for example, a car accident or fall) X-rays are usually not needed for the initial evaluation of back pain. If pain continues and does not respond to medical treatment, X-rays and other tests may be needed.  Home care  Try these home care recommendations:  · When in bed, try to find a position of comfort. A firm mattress is best. Try lying flat on your back with pillows under your knees. You can also try lying on your side with your knees  bent up towards your chest and a pillow between your knees.  · At first, do not try to stretch out the sore spots. If there is a strain, it is not like the good soreness you get after exercising without an injury. In this case, stretching may make it worse.  · Avoid prolong sitting, long car rides, or travel. This puts more stress on the lower back than standing or walking.  · During the first 24 to 72 hours after an acute injury or flare up of chronic back pain, apply an ice pack to the painful area for 20 minutes and then remove it for 20 minutes. Do this over a period of 60 to 90 minutes or several times a day. This will reduce swelling and pain. Wrap the ice pack in a thin towel or plastic to protect your skin.  · You can start with ice, then switch to heat. Heat (hot shower, hot bath, or heating pad) reduces pain and works well for muscle spasms. Heat can be applied to the painful area for 20 minutes then remove it for 20 minutes. Do this over a period of 60 to 90 minutes or several times a day. Do not sleep on a heating pad. It can lead to skin burns or tissue damage.  · You can alternate ice and heat therapy. Talk with your doctor about the best treatment for your back pain.  · Therapeutic massage can help relax the back muscles without stretching them.  · Be aware of safe lifting methods and do not lift anything without stretching first.  Medicines  Talk to your doctor before using medicine, especially if you have other medical problems or are taking other medicines.  · You may use over-the-counter medicine as directed on the bottle to control pain, unless another pain medicine was prescribed. If you have chronic conditions like diabetes, liver or kidney disease, stomach ulcers, or gastrointestinal bleeding, or are taking blood thinners, talk to your doctor before taking any medicine.  · Be careful if you are given a prescription medicines, narcotics, or medicine for muscle spasms. They can cause drowsiness,  affect your coordination, reflexes, and judgement. Do not drive or operate heavy machinery.  Follow-up care  Follow up with your healthcare provider, or as advised.   A radiologist will review any X-rays that were taken. Your provide will notify you of any new findings that may affect your care.  Call 911  Call emergency services if any of the following occur:  · Trouble breathing  · Confusion  · Very drowsy or trouble awakening  · Fainting or loss of consciousness  · Rapid or very slow heart rate  · Loss of bowel or bladder control  When to seek medical advice  Call your healthcare provider right away if any of these occur:   · Pain becomes worse or spreads to your legs  · Weakness or numbness in one or both legs  · Numbness in the groin or genital area  Date Last Reviewed: 7/1/2016 © 2000-2017 [employer]. [address] [place] [address], [place] [address]. All rights reserved. This information is not intended as a substitute for professional medical care. Always follow your healthcare professional's instructions.        Causes of Lumbar (Low Back) Pain  Low back pain can be caused by problems with any part of the lumbar spine. A disk can herniate (push out) and press on a nerve. Vertebrae can rub against each other or slip out of place. This can irritate facet joints and nerves. It can also lead to stenosis, a narrowing of the spinal canal or foramen.  Pressure from a disk  Constant wear and tear on a disk can cause it to weaken and push outward. Part of the disk may then press on nearby nerves. There are two common types of herniated disks:  Contained means the soft nucleus is protruding outward.   Extruded means the firm annulus has torn, letting the soft center squeeze through.     Pressure from bone  An unstable spine   With age, a disk may thin and wear out. Vertebrae above and below the disk may begin to touch. This can put pressure on nerves. It can also cause bone spurs (growths) to form where the  bones rub together.    Stenosis results when bone spurs narrow the foramen or spinal canal. This also puts pressure on nerves. Slipping vertebrae can irritate nerves and joints. They can also worsen stenosis.    In some cases, vertebrae become unstable and slip forward. This is called spondylolisthesis.     Date Last Reviewed: 10/12/2015  © 9251-4527 [employer]. [address] [place] [address]. All rights reserved. This information is not intended as a substitute for professional medical care. Always follow your healthcare professional's instructions.        Back Safety: Bending  Bending can strain or even injure your back. Follow the tips below to move safely and protect your back as you perform everyday activities.  Bending over    · Keep your feet shoulder-width apart.  · Move your whole body as one unit.  · Bend at your hips and knees, not at your waist.  · Flatten your stomach and tighten your leg muscles.  · To keep your spine straight, let your buttocks move out behind you. Dont try to tuck them under.  · If you need to, place one hand on a sturdy object for support.     Bending to the floor  · Lower yourself to one knee. If you can, rest one hand on a sturdy object to help lower yourself.  · Rest one arm on your raised knee.  · Dont bend at the waist.  · Do not hunch your back or neck to reach to the floor. Instead, bend more at your hips and knees to get closer.  Date Last Reviewed: 8/31/2015  © 6372-2411 [employer]. [address] [place] [address]. All rights reserved. This information is not intended as a substitute for professional medical care. Always follow your healthcare professional's instructions.    -Toradol shot today in the clinic.  -Lodine as needed for pain.  -Zanaflex as needed for muscle spasms.  -Follow up with Orthopedics.  -If your symptoms worsen go to the ER.    Please follow up with your Primary care provider within 2-5 days if your  signs and symptoms have not resolved or worsen.     If your condition worsens or fails to improve we recommend that you receive another evaluation at the emergency room immediately or contact your primary medical clinic to discuss your concerns.   You must understand that you have received an Urgent Care treatment only and that you may be released before all of your medical problems are known or treated. You, the patient, will arrange for follow up care as instructed.

## 2018-09-14 NOTE — PATIENT INSTRUCTIONS
Back Pain (Acute or Chronic)    Back pain is one of the most common problems. The good news is that most people feel better in 1 to 2 weeks, and most of the rest in 1 to 2 months. Most people can remain active.  People experience and describe pain differently; not everyone is the same.  · The pain can be sharp, stabbing, shooting, aching, cramping or burning.  · Movement, standing, bending, lifting, sitting, or walking may worsen pain.  · It can be localized to one spot or area, or it can be more generalized.  · It can spread or radiate upwards, to the front, or go down your arms or legs (sciatica).  · It can cause muscle spasm.  Most of the time, mechanical problems with the muscles or spine cause the pain. Mechanical problems are usually caused by an injury to the muscles or ligaments. While illness can cause back pain, it is usually not caused by a serious illness. Mechanical problems include:   · Physical activity such as sports, exercise, work, or normal activity  · Overexertion, lifting, pushing, pulling incorrectly or too aggressively  · Sudden twisting, bending, or stretching from an accident, or accidental movement  · Poor posture  · Stretching or moving wrong, without noticing pain at the time  · Poor coordination, lack of regular exercise (check with your doctor about this)  · Spinal disc disease or arthritis  · Stress  Pain can also be related to pregnancy, or illness like appendicitis, bladder or kidney infections, pelvic infections, and many other things.  Acute back pain usually gets better in 1 to 2 weeks. Back pain related to disk disease, arthritis in the spinal joints or spinal stenosis (narrowing of the spinal canal) can become chronic and last for months or years.  Unless you had a physical injury (for example, a car accident or fall) X-rays are usually not needed for the initial evaluation of back pain. If pain continues and does not respond to medical treatment, X-rays and other tests may be  needed.  Home care  Try these home care recommendations:  · When in bed, try to find a position of comfort. A firm mattress is best. Try lying flat on your back with pillows under your knees. You can also try lying on your side with your knees bent up towards your chest and a pillow between your knees.  · At first, do not try to stretch out the sore spots. If there is a strain, it is not like the good soreness you get after exercising without an injury. In this case, stretching may make it worse.  · Avoid prolong sitting, long car rides, or travel. This puts more stress on the lower back than standing or walking.  · During the first 24 to 72 hours after an acute injury or flare up of chronic back pain, apply an ice pack to the painful area for 20 minutes and then remove it for 20 minutes. Do this over a period of 60 to 90 minutes or several times a day. This will reduce swelling and pain. Wrap the ice pack in a thin towel or plastic to protect your skin.  · You can start with ice, then switch to heat. Heat (hot shower, hot bath, or heating pad) reduces pain and works well for muscle spasms. Heat can be applied to the painful area for 20 minutes then remove it for 20 minutes. Do this over a period of 60 to 90 minutes or several times a day. Do not sleep on a heating pad. It can lead to skin burns or tissue damage.  · You can alternate ice and heat therapy. Talk with your doctor about the best treatment for your back pain.  · Therapeutic massage can help relax the back muscles without stretching them.  · Be aware of safe lifting methods and do not lift anything without stretching first.  Medicines  Talk to your doctor before using medicine, especially if you have other medical problems or are taking other medicines.  · You may use over-the-counter medicine as directed on the bottle to control pain, unless another pain medicine was prescribed. If you have chronic conditions like diabetes, liver or kidney disease,  stomach ulcers, or gastrointestinal bleeding, or are taking blood thinners, talk to your doctor before taking any medicine.  · Be careful if you are given a prescription medicines, narcotics, or medicine for muscle spasms. They can cause drowsiness, affect your coordination, reflexes, and judgement. Do not drive or operate heavy machinery.  Follow-up care  Follow up with your healthcare provider, or as advised.   A radiologist will review any X-rays that were taken. Your provide will notify you of any new findings that may affect your care.  Call 911  Call emergency services if any of the following occur:  · Trouble breathing  · Confusion  · Very drowsy or trouble awakening  · Fainting or loss of consciousness  · Rapid or very slow heart rate  · Loss of bowel or bladder control  When to seek medical advice  Call your healthcare provider right away if any of these occur:   · Pain becomes worse or spreads to your legs  · Weakness or numbness in one or both legs  · Numbness in the groin or genital area  Date Last Reviewed: 7/1/2016 © 2000-2017 [employer]. [address] [place] [address]. All rights reserved. This information is not intended as a substitute for professional medical care. Always follow your healthcare professional's instructions.        Causes of Lumbar (Low Back) Pain  Low back pain can be caused by problems with any part of the lumbar spine. A disk can herniate (push out) and press on a nerve. Vertebrae can rub against each other or slip out of place. This can irritate facet joints and nerves. It can also lead to stenosis, a narrowing of the spinal canal or foramen.  Pressure from a disk  Constant wear and tear on a disk can cause it to weaken and push outward. Part of the disk may then press on nearby nerves. There are two common types of herniated disks:  Contained means the soft nucleus is protruding outward.   Extruded means the firm annulus has torn, letting the soft  center squeeze through.     Pressure from bone  An unstable spine   With age, a disk may thin and wear out. Vertebrae above and below the disk may begin to touch. This can put pressure on nerves. It can also cause bone spurs (growths) to form where the bones rub together.    Stenosis results when bone spurs narrow the foramen or spinal canal. This also puts pressure on nerves. Slipping vertebrae can irritate nerves and joints. They can also worsen stenosis.    In some cases, vertebrae become unstable and slip forward. This is called spondylolisthesis.     Date Last Reviewed: 10/12/2015  © 2370-5880 [employer]. [address] [place] [address]. All rights reserved. This information is not intended as a substitute for professional medical care. Always follow your healthcare professional's instructions.        Back Safety: Bending  Bending can strain or even injure your back. Follow the tips below to move safely and protect your back as you perform everyday activities.  Bending over    · Keep your feet shoulder-width apart.  · Move your whole body as one unit.  · Bend at your hips and knees, not at your waist.  · Flatten your stomach and tighten your leg muscles.  · To keep your spine straight, let your buttocks move out behind you. Dont try to tuck them under.  · If you need to, place one hand on a sturdy object for support.     Bending to the floor  · Lower yourself to one knee. If you can, rest one hand on a sturdy object to help lower yourself.  · Rest one arm on your raised knee.  · Dont bend at the waist.  · Do not hunch your back or neck to reach to the floor. Instead, bend more at your hips and knees to get closer.  Date Last Reviewed: 8/31/2015  © 5207-7822 [employer]. [address] [place] [address]. All rights reserved. This information is not intended as a substitute for professional medical care. Always follow your healthcare professional's  instructions.    -Toradol shot today in the clinic.  -Lodine as needed for pain.  -Zanaflex as needed for muscle spasms.  -Follow up with Orthopedics.  -If your symptoms worsen go to the ER.    Please follow up with your Primary care provider within 2-5 days if your signs and symptoms have not resolved or worsen.     If your condition worsens or fails to improve we recommend that you receive another evaluation at the emergency room immediately or contact your primary medical clinic to discuss your concerns.   You must understand that you have received an Urgent Care treatment only and that you may be released before all of your medical problems are known or treated. You, the patient, will arrange for follow up care as instructed.